# Patient Record
(demographics unavailable — no encounter records)

---

## 2024-11-07 NOTE — PHYSICAL EXAM
[No Acute Distress] : no acute distress [Well-Appearing] : well-appearing [Supple] : supple [No Respiratory Distress] : no respiratory distress  [No Accessory Muscle Use] : no accessory muscle use [Normal Gait] : normal gait [Speech Grossly Normal] : speech grossly normal [Normal Affect] : the affect was normal [Normal Mood] : the mood was normal

## 2024-11-07 NOTE — ASSESSMENT
[FreeTextEntry1] : Coronary artery calcification.  Stable.  Continue aspirin 81 mg daily with food.  Followed by cardiologist Hemoglobin A1c Hyperlipidemia.  Continue Zetia 10 mg daily.  Continue Crestor 20 mg daily.  Fasting lipid.  want LDL<100 Hepatitis C antibody Blood was drawn at office today. Patient refuses influenza vaccine today COVID-19  booster vaccine is recommended once a year in fall. CPE in 6 months

## 2024-11-07 NOTE — COUNSELING
[de-identified] : Discussed with patient importance of healthy diet, regular exercise (> 2.5 hours/week), and maintaining healthy weight.

## 2024-11-07 NOTE — HISTORY OF PRESENT ILLNESS
[FreeTextEntry1] : Follow-up for coronary artery calcification, hyperlipidemia, and high glucose [de-identified] :  See "CC" sheet

## 2025-05-08 NOTE — COUNSELING
[de-identified] : Discussed with patient importance of healthy diet, regular exercise (> 2.5 hours/week), and maintaining healthy weight.

## 2025-05-08 NOTE — HISTORY OF PRESENT ILLNESS
[FreeTextEntry1] : 52-year-old male CPE History of coronary artery calcification, hyperlipidemia, and high glucose [de-identified] : Occasional mild left groin discomfort.  No mass.  No fever.  No nausea.  See "CC" sheet

## 2025-05-08 NOTE — ASSESSMENT
[FreeTextEntry1] : Coronary artery calcification.  Stable.  Continue aspirin 81 mg daily with food.  Reminded patient to follow-up with cardiologist in 2025. [place] Hyperlipidemia.  Continue Crestor 20 mg daily.  Continue Zetia 10 mg daily.  Fasting lipid Hemoglobin A1c Varicose vein.  Patient refuses referral to vascular surgeon at this time.  Follow Patient refuses Tdap Left groin discomfort.  Ultrasound [facility] Follow-up in 6 months COVID-19  booster vaccine is recommended once a year in fall. [Vaccines Reviewed] : Immunizations reviewed today. Please see immunization details in the vaccine log within the immunization flowsheet.